# Patient Record
Sex: FEMALE | Race: OTHER | Employment: UNEMPLOYED | URBAN - METROPOLITAN AREA
[De-identification: names, ages, dates, MRNs, and addresses within clinical notes are randomized per-mention and may not be internally consistent; named-entity substitution may affect disease eponyms.]

---

## 2024-05-15 ENCOUNTER — OFFICE VISIT (OUTPATIENT)
Dept: PRIMARY CARE | Facility: CLINIC | Age: 29
End: 2024-05-15
Payer: MEDICAID

## 2024-05-15 VITALS
BODY MASS INDEX: 37.39 KG/M2 | OXYGEN SATURATION: 98 % | DIASTOLIC BLOOD PRESSURE: 92 MMHG | SYSTOLIC BLOOD PRESSURE: 136 MMHG | WEIGHT: 211 LBS | HEART RATE: 85 BPM | HEIGHT: 63 IN

## 2024-05-15 DIAGNOSIS — M25.551 BILATERAL HIP PAIN: ICD-10-CM

## 2024-05-15 DIAGNOSIS — E28.2 PCOS (POLYCYSTIC OVARIAN SYNDROME): ICD-10-CM

## 2024-05-15 DIAGNOSIS — Z01.419 ENCOUNTER FOR GYNECOLOGICAL EXAMINATION: ICD-10-CM

## 2024-05-15 DIAGNOSIS — B96.89 BV (BACTERIAL VAGINOSIS): ICD-10-CM

## 2024-05-15 DIAGNOSIS — N76.0 BV (BACTERIAL VAGINOSIS): ICD-10-CM

## 2024-05-15 DIAGNOSIS — R00.2 HEART PALPITATIONS: ICD-10-CM

## 2024-05-15 DIAGNOSIS — R10.2 PELVIC PAIN: ICD-10-CM

## 2024-05-15 DIAGNOSIS — M25.552 BILATERAL HIP PAIN: ICD-10-CM

## 2024-05-15 DIAGNOSIS — F41.9 ANXIETY: ICD-10-CM

## 2024-05-15 DIAGNOSIS — Z00.00 ANNUAL PHYSICAL EXAM: Primary | ICD-10-CM

## 2024-05-15 DIAGNOSIS — L70.0 ACNE VULGARIS: ICD-10-CM

## 2024-05-15 DIAGNOSIS — N92.6 IRREGULAR MENSES: ICD-10-CM

## 2024-05-15 DIAGNOSIS — L68.0 FEMALE HIRSUTISM: ICD-10-CM

## 2024-05-15 DIAGNOSIS — R63.5 WEIGHT GAIN: ICD-10-CM

## 2024-05-15 DIAGNOSIS — E66.9 CLASS 2 OBESITY: ICD-10-CM

## 2024-05-15 LAB
APPEARANCE UR: CLEAR
BILIRUB UR QL STRIP: NEGATIVE
COLOR UR: YELLOW
GLUCOSE UR STRIP-MCNC: NEGATIVE MG/DL
HGB UR QL STRIP: NEGATIVE
KETONES UR STRIP-MCNC: NEGATIVE MG/DL
LEUKOCYTE ESTERASE UR QL STRIP: NEGATIVE
NITRITE UR QL STRIP: NEGATIVE
PH UR STRIP: 5.5 [PH]
PROT UR STRIP-MCNC: NEGATIVE MG/DL
SP GR UR STRIP.AUTO: >=1.03
UROBILINOGEN UR STRIP-ACNC: 0.2 E.U./DL

## 2024-05-15 PROCEDURE — 87205 SMEAR GRAM STAIN: CPT

## 2024-05-15 PROCEDURE — 99204 OFFICE O/P NEW MOD 45 MIN: CPT | Performed by: NURSE PRACTITIONER

## 2024-05-15 PROCEDURE — 81003 URINALYSIS AUTO W/O SCOPE: CPT | Performed by: NURSE PRACTITIONER

## 2024-05-15 PROCEDURE — 1036F TOBACCO NON-USER: CPT | Performed by: NURSE PRACTITIONER

## 2024-05-15 RX ORDER — BUSPIRONE HYDROCHLORIDE 5 MG/1
5 TABLET ORAL 2 TIMES DAILY
Qty: 60 TABLET | Refills: 1 | Status: SHIPPED | OUTPATIENT
Start: 2024-05-15 | End: 2024-06-11 | Stop reason: WASHOUT

## 2024-05-15 RX ORDER — BUPROPION HYDROCHLORIDE 150 MG/1
150 TABLET ORAL EVERY MORNING
Qty: 30 TABLET | Refills: 1 | Status: SHIPPED | OUTPATIENT
Start: 2024-05-15 | End: 2024-06-11 | Stop reason: WASHOUT

## 2024-05-15 ASSESSMENT — ENCOUNTER SYMPTOMS
NERVOUS/ANXIOUS: 0
CONFUSION: 0
MUSCULOSKELETAL NEGATIVE: 1
WEAKNESS: 0
GASTROINTESTINAL NEGATIVE: 1
RESPIRATORY NEGATIVE: 1
PSYCHIATRIC NEGATIVE: 1
POLYPHAGIA: 0
DEPRESSION: 1
HEMATOLOGIC/LYMPHATIC NEGATIVE: 1
LIGHT-HEADEDNESS: 0
CONSTITUTIONAL NEGATIVE: 1
EYES NEGATIVE: 1
FACIAL ASYMMETRY: 0
WOUND: 0
SLEEP DISTURBANCE: 0
DIZZINESS: 0
NEUROLOGICAL NEGATIVE: 1
PALPITATIONS: 1

## 2024-05-16 ENCOUNTER — LAB (OUTPATIENT)
Dept: LAB | Facility: LAB | Age: 29
End: 2024-05-16
Payer: MEDICAID

## 2024-05-16 ENCOUNTER — HOSPITAL ENCOUNTER (OUTPATIENT)
Dept: RADIOLOGY | Facility: HOSPITAL | Age: 29
Discharge: HOME | End: 2024-05-16
Payer: MEDICAID

## 2024-05-16 DIAGNOSIS — E66.9 CLASS 2 OBESITY: ICD-10-CM

## 2024-05-16 DIAGNOSIS — M25.551 BILATERAL HIP PAIN: ICD-10-CM

## 2024-05-16 DIAGNOSIS — R10.2 PELVIC PAIN: ICD-10-CM

## 2024-05-16 DIAGNOSIS — M25.552 BILATERAL HIP PAIN: ICD-10-CM

## 2024-05-16 DIAGNOSIS — R63.5 WEIGHT GAIN: ICD-10-CM

## 2024-05-16 DIAGNOSIS — L70.0 ACNE VULGARIS: ICD-10-CM

## 2024-05-16 DIAGNOSIS — Z00.00 ANNUAL PHYSICAL EXAM: ICD-10-CM

## 2024-05-16 DIAGNOSIS — L68.0 FEMALE HIRSUTISM: ICD-10-CM

## 2024-05-16 DIAGNOSIS — N92.6 IRREGULAR MENSES: ICD-10-CM

## 2024-05-16 LAB
25(OH)D3 SERPL-MCNC: 25 NG/ML (ref 30–100)
ALBUMIN SERPL BCP-MCNC: 4.5 G/DL (ref 3.4–5)
ALP SERPL-CCNC: 111 U/L (ref 33–110)
ALT SERPL W P-5'-P-CCNC: 16 U/L (ref 7–45)
ANION GAP SERPL CALC-SCNC: 13 MMOL/L (ref 10–20)
AST SERPL W P-5'-P-CCNC: 16 U/L (ref 9–39)
BACTERIAL VAGINOSIS VAG-IMP: NORMAL
BASOPHILS # BLD AUTO: 0.06 X10*3/UL (ref 0–0.1)
BASOPHILS NFR BLD AUTO: 0.5 %
BILIRUB SERPL-MCNC: 0.5 MG/DL (ref 0–1.2)
BUN SERPL-MCNC: 8 MG/DL (ref 6–23)
CALCIUM SERPL-MCNC: 9.2 MG/DL (ref 8.6–10.3)
CHLORIDE SERPL-SCNC: 107 MMOL/L (ref 98–107)
CHOLEST SERPL-MCNC: 173 MG/DL (ref 0–199)
CHOLESTEROL/HDL RATIO: 3.7
CLUE CELLS VAG LPF-#/AREA: NORMAL /[LPF]
CO2 SERPL-SCNC: 23 MMOL/L (ref 21–32)
CREAT SERPL-MCNC: 0.54 MG/DL (ref 0.5–1.05)
EGFRCR SERPLBLD CKD-EPI 2021: >90 ML/MIN/1.73M*2
EOSINOPHIL # BLD AUTO: 0.07 X10*3/UL (ref 0–0.7)
EOSINOPHIL NFR BLD AUTO: 0.6 %
ERYTHROCYTE [DISTWIDTH] IN BLOOD BY AUTOMATED COUNT: 13 % (ref 11.5–14.5)
FSH SERPL-ACNC: 3.5 IU/L
GLUCOSE SERPL-MCNC: 85 MG/DL (ref 74–99)
HCT VFR BLD AUTO: 41 % (ref 36–46)
HDLC SERPL-MCNC: 47.3 MG/DL
HGB BLD-MCNC: 13.6 G/DL (ref 12–16)
IMM GRANULOCYTES # BLD AUTO: 0.02 X10*3/UL (ref 0–0.7)
IMM GRANULOCYTES NFR BLD AUTO: 0.2 % (ref 0–0.9)
IRON SATN MFR SERPL: 13 % (ref 25–45)
IRON SERPL-MCNC: 61 UG/DL (ref 35–150)
LDLC SERPL CALC-MCNC: 103 MG/DL
LH SERPL-ACNC: 5.9 IU/L
LYMPHOCYTES # BLD AUTO: 3.31 X10*3/UL (ref 1.2–4.8)
LYMPHOCYTES NFR BLD AUTO: 29.6 %
MCH RBC QN AUTO: 26.8 PG (ref 26–34)
MCHC RBC AUTO-ENTMCNC: 33.2 G/DL (ref 32–36)
MCV RBC AUTO: 81 FL (ref 80–100)
MONOCYTES # BLD AUTO: 0.52 X10*3/UL (ref 0.1–1)
MONOCYTES NFR BLD AUTO: 4.6 %
NEUTROPHILS # BLD AUTO: 7.22 X10*3/UL (ref 1.2–7.7)
NEUTROPHILS NFR BLD AUTO: 64.5 %
NON HDL CHOLESTEROL: 126 MG/DL (ref 0–149)
NRBC BLD-RTO: 0 /100 WBCS (ref 0–0)
NUGENT SCORE: 4
PLATELET # BLD AUTO: 337 X10*3/UL (ref 150–450)
POTASSIUM SERPL-SCNC: 4.1 MMOL/L (ref 3.5–5.3)
PROT SERPL-MCNC: 7.3 G/DL (ref 6.4–8.2)
RBC # BLD AUTO: 5.08 X10*6/UL (ref 4–5.2)
SODIUM SERPL-SCNC: 139 MMOL/L (ref 136–145)
T3 SERPL-MCNC: 144 NG/DL (ref 60–200)
T4 SERPL-MCNC: 7.2 UG/DL (ref 4.5–11.1)
TIBC SERPL-MCNC: 455 UG/DL (ref 240–445)
TRIGL SERPL-MCNC: 116 MG/DL (ref 0–149)
TSH SERPL-ACNC: 1.82 MIU/L (ref 0.44–3.98)
UIBC SERPL-MCNC: 394 UG/DL (ref 110–370)
VLDL: 23 MG/DL (ref 0–40)
WBC # BLD AUTO: 11.2 X10*3/UL (ref 4.4–11.3)
YEAST VAG WET PREP-#/AREA: NORMAL

## 2024-05-16 PROCEDURE — 83550 IRON BINDING TEST: CPT

## 2024-05-16 PROCEDURE — 82306 VITAMIN D 25 HYDROXY: CPT

## 2024-05-16 PROCEDURE — 73522 X-RAY EXAM HIPS BI 3-4 VIEWS: CPT | Mod: BILATERAL PROCEDURE | Performed by: RADIOLOGY

## 2024-05-16 PROCEDURE — 83002 ASSAY OF GONADOTROPIN (LH): CPT

## 2024-05-16 PROCEDURE — 84402 ASSAY OF FREE TESTOSTERONE: CPT

## 2024-05-16 PROCEDURE — 84436 ASSAY OF TOTAL THYROXINE: CPT

## 2024-05-16 PROCEDURE — 85025 COMPLETE CBC W/AUTO DIFF WBC: CPT

## 2024-05-16 PROCEDURE — 36415 COLL VENOUS BLD VENIPUNCTURE: CPT

## 2024-05-16 PROCEDURE — 73522 X-RAY EXAM HIPS BI 3-4 VIEWS: CPT

## 2024-05-16 PROCEDURE — 80061 LIPID PANEL: CPT

## 2024-05-16 PROCEDURE — 84480 ASSAY TRIIODOTHYRONINE (T3): CPT

## 2024-05-16 PROCEDURE — 83540 ASSAY OF IRON: CPT

## 2024-05-16 PROCEDURE — 83036 HEMOGLOBIN GLYCOSYLATED A1C: CPT

## 2024-05-16 PROCEDURE — 84443 ASSAY THYROID STIM HORMONE: CPT

## 2024-05-16 PROCEDURE — 80053 COMPREHEN METABOLIC PANEL: CPT

## 2024-05-16 PROCEDURE — 83001 ASSAY OF GONADOTROPIN (FSH): CPT

## 2024-05-16 PROCEDURE — 82672 ASSAY OF ESTROGEN: CPT

## 2024-05-17 ENCOUNTER — HOSPITAL ENCOUNTER (OUTPATIENT)
Dept: CARDIOLOGY | Facility: CLINIC | Age: 29
Discharge: HOME | End: 2024-05-17
Payer: MEDICAID

## 2024-05-17 DIAGNOSIS — D50.9 IRON DEFICIENCY ANEMIA, UNSPECIFIED IRON DEFICIENCY ANEMIA TYPE: ICD-10-CM

## 2024-05-17 DIAGNOSIS — E55.9 VITAMIN D DEFICIENCY: Primary | ICD-10-CM

## 2024-05-17 DIAGNOSIS — R00.2 HEART PALPITATIONS: ICD-10-CM

## 2024-05-17 LAB
EST. AVERAGE GLUCOSE BLD GHB EST-MCNC: 103 MG/DL
HBA1C MFR BLD: 5.2 %

## 2024-05-17 PROCEDURE — 93225 XTRNL ECG REC<48 HRS REC: CPT

## 2024-05-17 PROCEDURE — 93227 XTRNL ECG REC<48 HR R&I: CPT | Performed by: INTERNAL MEDICINE

## 2024-05-17 RX ORDER — FERROUS GLUCONATE 325 MG
38 TABLET ORAL
Qty: 30 TABLET | Refills: 11 | Status: SHIPPED | OUTPATIENT
Start: 2024-05-17 | End: 2025-05-17

## 2024-05-17 RX ORDER — ERGOCALCIFEROL 1.25 MG/1
50000 CAPSULE ORAL
Qty: 4 CAPSULE | Refills: 1 | Status: SHIPPED | OUTPATIENT
Start: 2024-05-19 | End: 2024-07-14

## 2024-05-20 LAB
TESTOSTERONE FREE (CHAN): 8.8 PG/ML (ref 0.1–6.4)
TESTOSTERONE,TOTAL,LC-MS/MS: 60 NG/DL (ref 2–45)

## 2024-05-21 ENCOUNTER — HOSPITAL ENCOUNTER (OUTPATIENT)
Dept: RADIOLOGY | Facility: CLINIC | Age: 29
Discharge: HOME | End: 2024-05-21
Payer: MEDICAID

## 2024-05-21 DIAGNOSIS — E28.2 PCOS (POLYCYSTIC OVARIAN SYNDROME): ICD-10-CM

## 2024-05-21 DIAGNOSIS — N92.6 IRREGULAR MENSES: ICD-10-CM

## 2024-05-21 DIAGNOSIS — R63.5 WEIGHT GAIN: ICD-10-CM

## 2024-05-21 PROCEDURE — 76856 US EXAM PELVIC COMPLETE: CPT | Performed by: RADIOLOGY

## 2024-05-21 PROCEDURE — 76856 US EXAM PELVIC COMPLETE: CPT

## 2024-05-21 PROCEDURE — 76830 TRANSVAGINAL US NON-OB: CPT | Performed by: RADIOLOGY

## 2024-05-24 LAB — ESTROGEN SERPL-MCNC: 365 PG/ML

## 2024-05-28 DIAGNOSIS — E28.2 PCOS (POLYCYSTIC OVARIAN SYNDROME): Primary | ICD-10-CM

## 2024-05-28 RX ORDER — SPIRONOLACTONE 25 MG/1
12.5 TABLET ORAL DAILY
Qty: 15 TABLET | Refills: 3 | Status: SHIPPED | OUTPATIENT
Start: 2024-05-28 | End: 2024-09-25

## 2024-05-28 RX ORDER — METFORMIN HYDROCHLORIDE 500 MG/1
500 TABLET, EXTENDED RELEASE ORAL
Qty: 30 TABLET | Refills: 11 | Status: SHIPPED | OUTPATIENT
Start: 2024-05-28 | End: 2025-05-28

## 2024-06-05 ASSESSMENT — PROMIS GLOBAL HEALTH SCALE
RATE_MENTAL_HEALTH: FAIR
RATE_QUALITY_OF_LIFE: GOOD
RATE_AVERAGE_FATIGUE: SEVERE
RATE_PHYSICAL_HEALTH: FAIR
EMOTIONAL_PROBLEMS: OFTEN
CARRYOUT_SOCIAL_ACTIVITIES: GOOD
RATE_GENERAL_HEALTH: GOOD
RATE_AVERAGE_PAIN: 0
CARRYOUT_PHYSICAL_ACTIVITIES: COMPLETELY
RATE_SOCIAL_SATISFACTION: GOOD

## 2024-06-11 ENCOUNTER — LAB (OUTPATIENT)
Dept: LAB | Facility: LAB | Age: 29
End: 2024-06-11
Payer: MEDICAID

## 2024-06-11 ENCOUNTER — OFFICE VISIT (OUTPATIENT)
Dept: PRIMARY CARE | Facility: CLINIC | Age: 29
End: 2024-06-11
Payer: MEDICAID

## 2024-06-11 VITALS
SYSTOLIC BLOOD PRESSURE: 128 MMHG | HEART RATE: 100 BPM | HEIGHT: 63 IN | BODY MASS INDEX: 36.86 KG/M2 | WEIGHT: 208 LBS | DIASTOLIC BLOOD PRESSURE: 82 MMHG | OXYGEN SATURATION: 98 %

## 2024-06-11 DIAGNOSIS — F41.9 ANXIETY: Primary | ICD-10-CM

## 2024-06-11 DIAGNOSIS — Z83.2 FAMILY HISTORY OF AUTOIMMUNE DISORDER: ICD-10-CM

## 2024-06-11 DIAGNOSIS — R20.2 TINGLING IN EXTREMITIES: ICD-10-CM

## 2024-06-11 LAB
CCP IGG SERPL-ACNC: <1 U/ML
CRP SERPL-MCNC: 0.63 MG/DL
ERYTHROCYTE [SEDIMENTATION RATE] IN BLOOD BY WESTERGREN METHOD: 8 MM/H (ref 0–20)
RHEUMATOID FACT SER NEPH-ACNC: 10 IU/ML (ref 0–15)

## 2024-06-11 PROCEDURE — 86140 C-REACTIVE PROTEIN: CPT

## 2024-06-11 PROCEDURE — 86200 CCP ANTIBODY: CPT

## 2024-06-11 PROCEDURE — 86431 RHEUMATOID FACTOR QUANT: CPT

## 2024-06-11 PROCEDURE — 86038 ANTINUCLEAR ANTIBODIES: CPT

## 2024-06-11 PROCEDURE — 85652 RBC SED RATE AUTOMATED: CPT

## 2024-06-11 PROCEDURE — 99213 OFFICE O/P EST LOW 20 MIN: CPT | Performed by: NURSE PRACTITIONER

## 2024-06-11 PROCEDURE — 36415 COLL VENOUS BLD VENIPUNCTURE: CPT

## 2024-06-11 PROCEDURE — 1036F TOBACCO NON-USER: CPT | Performed by: NURSE PRACTITIONER

## 2024-06-11 RX ORDER — ESCITALOPRAM OXALATE 5 MG/1
5 TABLET ORAL DAILY
Qty: 30 TABLET | Refills: 2 | Status: SHIPPED | OUTPATIENT
Start: 2024-06-11 | End: 2024-09-09

## 2024-06-11 ASSESSMENT — ENCOUNTER SYMPTOMS
RESPIRATORY NEGATIVE: 1
FACIAL ASYMMETRY: 0
WOUND: 0
PSYCHIATRIC NEGATIVE: 1
WEAKNESS: 0
DEPRESSION: 0
CONSTITUTIONAL NEGATIVE: 1
GASTROINTESTINAL NEGATIVE: 1
CONFUSION: 0
POLYPHAGIA: 0
LIGHT-HEADEDNESS: 0
NERVOUS/ANXIOUS: 0
MUSCULOSKELETAL NEGATIVE: 1
EYES NEGATIVE: 1
CARDIOVASCULAR NEGATIVE: 1
HEMATOLOGIC/LYMPHATIC NEGATIVE: 1
SLEEP DISTURBANCE: 0
NEUROLOGICAL NEGATIVE: 1
DIZZINESS: 0

## 2024-06-11 NOTE — PROGRESS NOTES
"Subjective   Patient ID: Do Cha is a 28 y.o. female who presents for Follow-up (1 month follow up).    HPI   Guzman is here for FU   Discussed labs and test results   Feels dizzy on the BuSpar.  Would like to go to a single dose medication for anxiety.  Agreeable to try Lexapro.    Of note she would like some autoimmune testing done.  Her mother does have significant history of rheumatoid arthritis.    Does complain of bilateral tingling in fingers.      Review of Systems   Constitutional: Negative.    Eyes: Negative.    Respiratory: Negative.     Cardiovascular: Negative.    Gastrointestinal: Negative.    Endocrine: Negative for polyphagia.   Genitourinary: Negative.    Musculoskeletal: Negative.    Skin: Negative.  Negative for pallor, rash and wound.   Neurological: Negative.  Negative for dizziness, facial asymmetry, weakness and light-headedness.   Hematological: Negative.    Psychiatric/Behavioral: Negative.  Negative for confusion, sleep disturbance and suicidal ideas. The patient is not nervous/anxious.    All other systems reviewed and are negative.      Objective   /82 (BP Location: Left arm, Patient Position: Sitting, BP Cuff Size: Adult)   Pulse 100   Ht 1.6 m (5' 3\")   Wt 94.3 kg (208 lb)   SpO2 98%   BMI 36.85 kg/m²     Physical Exam  Vitals and nursing note reviewed.   Constitutional:       Appearance: Normal appearance. She is normal weight.   HENT:      Head: Normocephalic.      Nose: Nose normal.      Mouth/Throat:      Mouth: Mucous membranes are moist.   Eyes:      Extraocular Movements: Extraocular movements intact.      Conjunctiva/sclera: Conjunctivae normal.      Pupils: Pupils are equal, round, and reactive to light.   Cardiovascular:      Rate and Rhythm: Normal rate and regular rhythm.      Pulses: Normal pulses.      Heart sounds: Normal heart sounds.   Pulmonary:      Effort: Pulmonary effort is normal.   Abdominal:      General: Abdomen is flat. Bowel sounds are " normal.      Palpations: Abdomen is soft.   Musculoskeletal:         General: Normal range of motion.      Cervical back: Normal range of motion.   Skin:     General: Skin is warm and dry.   Neurological:      General: No focal deficit present.      Mental Status: She is alert and oriented to person, place, and time.   Psychiatric:         Mood and Affect: Mood normal.         Behavior: Behavior normal.         Assessment/Plan   1. Anxiety    - escitalopram (Lexapro) 5 mg tablet; Take 1 tablet (5 mg) by mouth once daily.  Dispense: 30 tablet; Refill: 2    2. Family history of autoimmune disorder  - C-Reactive Protein; Future  - Sedimentation Rate; Future  - CADEN without Reflex TONY; Future  - Rheumatoid Factor; Future  - Citrulline Antibody, IgG; Future    3. Tingling in extremities  - EMG & nerve conduction; Future        FU in 1-2 months for labs and medication tolerance

## 2024-06-13 LAB — ANA SER QL HEP2 SUBST: NEGATIVE

## 2024-06-17 ENCOUNTER — APPOINTMENT (OUTPATIENT)
Dept: PRIMARY CARE | Facility: CLINIC | Age: 29
End: 2024-06-17
Payer: MEDICAID

## 2024-07-08 ASSESSMENT — ENCOUNTER SYMPTOMS
ABDOMINAL PAIN: 0
FREQUENCY: 0
SORE THROAT: 0
CHILLS: 0
HEADACHES: 0
ANOREXIA: 0
HEMATURIA: 0
FLANK PAIN: 0
CONSTIPATION: 0
DIARRHEA: 0
BACK PAIN: 1
DYSURIA: 0
VOMITING: 0
FEVER: 0
NAUSEA: 0

## 2024-07-09 ENCOUNTER — APPOINTMENT (OUTPATIENT)
Dept: OBSTETRICS AND GYNECOLOGY | Facility: CLINIC | Age: 29
End: 2024-07-09
Payer: MEDICAID

## 2024-07-09 VITALS — WEIGHT: 205 LBS | BODY MASS INDEX: 36.32 KG/M2 | HEIGHT: 63 IN

## 2024-07-09 DIAGNOSIS — Z01.419 ENCOUNTER FOR GYNECOLOGICAL EXAMINATION: ICD-10-CM

## 2024-07-09 DIAGNOSIS — N83.202 LEFT OVARIAN CYST: Primary | ICD-10-CM

## 2024-07-09 PROCEDURE — 99385 PREV VISIT NEW AGE 18-39: CPT | Performed by: OBSTETRICS & GYNECOLOGY

## 2024-07-09 PROCEDURE — 1036F TOBACCO NON-USER: CPT | Performed by: OBSTETRICS & GYNECOLOGY

## 2024-07-09 PROCEDURE — 88175 CYTOPATH C/V AUTO FLUID REDO: CPT

## 2024-07-09 ASSESSMENT — PAIN SCALES - GENERAL: PAINLEVEL: 0-NO PAIN

## 2024-07-09 NOTE — PROGRESS NOTES
"Do Cha is a 29 y.o. female who is here for a routine exam. PCP = Annel Saavedra, APRN-CNP  Recently diagnosed with PCOS based on ultrasound, irregular menses, weight issues, and acne  Elevated total and free testosterone  Pelvic ultrasound mid May showed 2 left ovarian cysts as well as multiple follicles on each ovary    C/o decreased libido since her son was born - rarely has intercourse with     Unable to lose weight  Weighed around 160# prior to first pregnancy, 154# PP visit after second pregnancy  PP depression after second pregnancy, ate more with depression    Menses : Q. 5 to 6 weeks  Contraception : none  HPV vaccine : No  Last pap : 2021? normal  Last HPV : uncertain but negative  History of abnormal pap : Yes, describe: abnormal pap and HPV + but follow up normal  Last mammogram : never  History of abnormal mammogram : No  Colon cancer screen : never    ROS  systems reviewed, anything negative noted in HPI    bladder: no dysuria, gross hematuria, urinary frequency, urgency or incontinence  breast: no lumps, nipple d/c, overlying skin changes, redness, or skin retraction    [unfilled]    Past med hx and past surg hx reviewed and notable for: PCOS    Objective   Ht 1.6 m (5' 3\")   Wt 93 kg (205 lb)   LMP 07/02/2024   BMI 36.31 kg/m²      General:   Alert and oriented, in no acute distress   Neck: Supple. No visible thyromegaly.    Breast/Axilla: Normal to palpation bilaterally without masses, skin changes, lymphadenopathy, or nipple discharge.    Abdomen: Soft, non-tender, without masses or organomegaly   Vulva: Normal architecture without erythema, masses, or lesions.    Vagina: Normal mucosa without lesions, masses, or atrophy. No abnormal vaginal discharge.    Cervix: Normal without masses, lesions, or signs of cervicitis.    Uterus: Normal mobile, non-enlarged uterus    Adnexa: Normal without masses or lesions   Pelvic Floor No POP noted.    Psych Normal affect. Normal mood.      Thank " you for coming to your annual exam. Your findings during the exam were normal. Specific topics addressed during this exam included: healthy lifestyle, well woman screening guidelines,     Actions performed during this visit include:  - Clinical breast exam  - Clinical pelvic exam  - pap/hpv  - pt declines hormonal options for contraception and regulating cycle  - will continue spironolactone and metformin with hopes that additional weight loss will regulate cycle  Orders Placed This Encounter   Procedures    US PELVIS TRANSABDOMINAL WITH TRANSVAGINAL           Please return for your next visit in 1 year.  Answers submitted by the patient for this visit:  Female Genital Questionnaire (Submitted on 7/8/2024)  Chief Complaint: Female genitourinary complaint  genital itching: Yes  genital lesions: No  genital odor: Yes  genital rash: No  missed menses: No  pelvic pain: No  vaginal bleeding: No  vaginal discharge: Yes  Chronicity: new  Onset: more than 1 month ago  Frequency: daily  Progression since onset: waxing and waning  Severity of pain: no pain  Pregnant now?: No  abdominal pain: No  anorexia: No  back pain: Yes  chills: No  constipation: No  diarrhea: No  discolored urine: No  dysuria: No  fever: No  flank pain: No  frequency: No  headaches: No  hematuria: No  joint pain: No  joint swelling: No  nausea: No  painful intercourse: No  rash: No  sore throat: No  urgency: No  vomiting: No  Aggravated by: nothing, activity  Sexual activity: sexually active  Partner with STD symptoms: no  Birth control: condoms  Menstrual history: regular  Discharge characteristics: copious, watery, white, yellow  Passing clots?: No  Passing tissue?: No

## 2024-07-17 LAB
CYTOLOGY CMNT CVX/VAG CYTO-IMP: NORMAL
LAB AP HPV GENOTYPE QUESTION: YES
LAB AP HPV HR: NORMAL
LABORATORY COMMENT REPORT: NORMAL
LMP START DATE: NORMAL
PATH REPORT.TOTAL CANCER: NORMAL

## 2024-07-18 ENCOUNTER — APPOINTMENT (OUTPATIENT)
Dept: PRIMARY CARE | Facility: CLINIC | Age: 29
End: 2024-07-18
Payer: MEDICAID

## 2024-07-18 DIAGNOSIS — R06.83 SNORING: ICD-10-CM

## 2024-07-18 DIAGNOSIS — E28.2 PCOS (POLYCYSTIC OVARIAN SYNDROME): ICD-10-CM

## 2024-07-18 DIAGNOSIS — D50.9 IRON DEFICIENCY ANEMIA, UNSPECIFIED IRON DEFICIENCY ANEMIA TYPE: ICD-10-CM

## 2024-07-18 DIAGNOSIS — G47.19 EXCESSIVE DAYTIME SLEEPINESS: ICD-10-CM

## 2024-07-18 DIAGNOSIS — M54.50 ACUTE BILATERAL LOW BACK PAIN WITHOUT SCIATICA: ICD-10-CM

## 2024-07-18 DIAGNOSIS — S03.00XA DISLOCATION OF TEMPOROMANDIBULAR JOINT, INITIAL ENCOUNTER: ICD-10-CM

## 2024-07-18 DIAGNOSIS — F41.9 ANXIETY: Primary | ICD-10-CM

## 2024-07-18 DIAGNOSIS — R68.84 JAW PAIN: ICD-10-CM

## 2024-07-18 DIAGNOSIS — N39.46 MIXED STRESS AND URGE URINARY INCONTINENCE: ICD-10-CM

## 2024-07-18 LAB
FERRITIN SERPL-MCNC: 34 NG/ML (ref 8–150)
FOLATE SERPL-MCNC: 9.6 NG/ML
IRON SATN MFR SERPL: 25 % (ref 25–45)
IRON SERPL-MCNC: 106 UG/DL (ref 35–150)
TIBC SERPL-MCNC: 428 UG/DL (ref 240–445)
UIBC SERPL-MCNC: 322 UG/DL (ref 110–370)

## 2024-07-18 PROCEDURE — 99213 OFFICE O/P EST LOW 20 MIN: CPT | Performed by: NURSE PRACTITIONER

## 2024-07-18 PROCEDURE — 36415 COLL VENOUS BLD VENIPUNCTURE: CPT

## 2024-07-18 PROCEDURE — 83540 ASSAY OF IRON: CPT

## 2024-07-18 PROCEDURE — 82728 ASSAY OF FERRITIN: CPT

## 2024-07-18 PROCEDURE — 82746 ASSAY OF FOLIC ACID SERUM: CPT

## 2024-07-18 PROCEDURE — 83550 IRON BINDING TEST: CPT

## 2024-07-18 RX ORDER — CYCLOBENZAPRINE HCL 5 MG
5 TABLET ORAL 3 TIMES DAILY PRN
Qty: 30 TABLET | Refills: 0 | Status: SHIPPED | OUTPATIENT
Start: 2024-07-18 | End: 2024-09-16

## 2024-07-18 RX ORDER — METFORMIN HYDROCHLORIDE 500 MG/1
500 TABLET, EXTENDED RELEASE ORAL
Qty: 60 TABLET | Refills: 11 | Status: SHIPPED | OUTPATIENT
Start: 2024-07-18 | End: 2025-07-18

## 2024-07-18 RX ORDER — ESCITALOPRAM OXALATE 10 MG/1
10 TABLET ORAL DAILY
Qty: 30 TABLET | Refills: 2 | Status: SHIPPED | OUTPATIENT
Start: 2024-07-18 | End: 2024-10-16

## 2024-07-18 ASSESSMENT — ENCOUNTER SYMPTOMS
WEAKNESS: 0
SLEEP DISTURBANCE: 1
LIGHT-HEADEDNESS: 0
FACIAL ASYMMETRY: 0
POLYPHAGIA: 0
JOINT SWELLING: 1
BACK PAIN: 1
RESPIRATORY NEGATIVE: 1
NERVOUS/ANXIOUS: 0
HEMATOLOGIC/LYMPHATIC NEGATIVE: 1
GASTROINTESTINAL NEGATIVE: 1
WOUND: 0
ALLERGIC/IMMUNOLOGIC NEGATIVE: 1
CONSTITUTIONAL NEGATIVE: 1
CARDIOVASCULAR NEGATIVE: 1
CONFUSION: 0
ARTHRALGIAS: 1
NEUROLOGICAL NEGATIVE: 1
EYES NEGATIVE: 1
MYALGIAS: 1
ENDOCRINE NEGATIVE: 1
DIZZINESS: 0

## 2024-07-18 NOTE — PROGRESS NOTES
"Subjective   Patient ID: Do Cha is a 29 y.o. female who presents for No chief complaint on file..    HPI   Doing ok on lexparo   Needs iron testing  EMG next week   - c/o jaw pain while eating and laughing, continues to get worse   States that she is excessively tired during the day and  report she's \"Snores all night long\"   BMI 36: needs sleep study r/o sleep disorder     Of note c/o worsening lower back pain and urinary inc. With laughing.   Discussed pelvic floor therapy  No loss of bowel function or bladder function       Review of Systems   Constitutional: Negative.    HENT:  Positive for dental problem.    Eyes: Negative.    Respiratory: Negative.     Cardiovascular: Negative.    Gastrointestinal: Negative.    Endocrine: Negative.  Negative for polyphagia.   Genitourinary:  Positive for urgency. Negative for decreased urine volume.   Musculoskeletal:  Positive for arthralgias, back pain, joint swelling and myalgias.   Skin: Negative.  Negative for pallor, rash and wound.   Allergic/Immunologic: Negative.    Neurological: Negative.  Negative for dizziness, facial asymmetry, weakness and light-headedness.   Hematological: Negative.    Psychiatric/Behavioral:  Positive for sleep disturbance. Negative for confusion and suicidal ideas. The patient is not nervous/anxious.    All other systems reviewed and are negative.      Objective   LMP 07/02/2024     Physical Exam  Vitals and nursing note reviewed.   Constitutional:       Appearance: Normal appearance. She is normal weight.   HENT:      Head: Normocephalic.      Nose: Nose normal.      Mouth/Throat:      Mouth: Mucous membranes are moist.   Eyes:      Extraocular Movements: Extraocular movements intact.      Conjunctiva/sclera: Conjunctivae normal.      Pupils: Pupils are equal, round, and reactive to light.   Cardiovascular:      Rate and Rhythm: Normal rate and regular rhythm.      Pulses: Normal pulses.      Heart sounds: Normal heart sounds. "   Pulmonary:      Effort: Pulmonary effort is normal.   Abdominal:      General: Abdomen is flat. Bowel sounds are normal.      Palpations: Abdomen is soft.   Musculoskeletal:         General: Tenderness and signs of injury present. No swelling or deformity. Normal range of motion.      Cervical back: Normal range of motion.      Right lower leg: No edema.      Left lower leg: No edema.   Skin:     General: Skin is warm and dry.   Neurological:      General: No focal deficit present.      Mental Status: She is alert and oriented to person, place, and time.   Psychiatric:         Mood and Affect: Mood normal.         Behavior: Behavior normal.         Assessment/Plan   1. Anxiety  - escitalopram (Lexapro) 10 mg tablet; Take 1 tablet (10 mg) by mouth once daily.  Dispense: 30 tablet; Refill: 2    2. Iron deficiency anemia, unspecified iron deficiency anemia type  - Iron and TIBC  - Ferritin  - Folate    3. Snoring  - Home sleep apnea test (HSAT); Future    4. Jaw pain  - XR temporomandibular joint open and closed bilateral; Future  - Referral to Oral Maxillofacial Surgery; Future  - cyclobenzaprine (Flexeril) 5 mg tablet; Take 1 tablet (5 mg) by mouth 3 times a day as needed for muscle spasms.  Dispense: 30 tablet; Refill: 0    5. Excessive daytime sleepiness  - Home sleep apnea test (HSAT); Future    6. Acute bilateral low back pain without sciatica    - XR lumbar spine 2-3 views; Future  - Referral to Physical Therapy; Future  - cyclobenzaprine (Flexeril) 5 mg tablet; Take 1 tablet (5 mg) by mouth 3 times a day as needed for muscle spasms.  Dispense: 30 tablet; Refill: 0    7. Mixed stress and urge urinary incontinence  - Referral to Physical Therapy; Future    8. Dislocation of temporomandibular joint, initial encounter  - Referral to Oral Maxillofacial Surgery; Future    9. PCOS (polycystic ovarian syndrome)    - metFORMIN XR (metFORMIN, MOD,) 500 mg 24 hr tablet; Take 1 tablet (500 mg) by mouth 2 times daily  (morning and late afternoon). Do not crush, chew, or split.  Dispense: 60 tablet; Refill: 11         FU in 3 months

## 2024-07-23 ENCOUNTER — HOSPITAL ENCOUNTER (OUTPATIENT)
Dept: RADIOLOGY | Facility: HOSPITAL | Age: 29
Discharge: HOME | End: 2024-07-23
Payer: MEDICAID

## 2024-07-23 ENCOUNTER — HOSPITAL ENCOUNTER (OUTPATIENT)
Dept: NEUROLOGY | Facility: HOSPITAL | Age: 29
Discharge: HOME | End: 2024-07-23
Payer: MEDICAID

## 2024-07-23 DIAGNOSIS — M54.50 ACUTE BILATERAL LOW BACK PAIN WITHOUT SCIATICA: ICD-10-CM

## 2024-07-23 DIAGNOSIS — N83.202 LEFT OVARIAN CYST: ICD-10-CM

## 2024-07-23 DIAGNOSIS — R20.2 TINGLING IN EXTREMITIES: ICD-10-CM

## 2024-07-23 DIAGNOSIS — R68.84 JAW PAIN: ICD-10-CM

## 2024-07-23 PROCEDURE — 76856 US EXAM PELVIC COMPLETE: CPT

## 2024-07-23 PROCEDURE — 95886 MUSC TEST DONE W/N TEST COMP: CPT | Performed by: PSYCHIATRY & NEUROLOGY

## 2024-07-23 PROCEDURE — 76830 TRANSVAGINAL US NON-OB: CPT | Performed by: RADIOLOGY

## 2024-07-23 PROCEDURE — 70330 X-RAY EXAM OF JAW JOINTS: CPT

## 2024-07-23 PROCEDURE — 72100 X-RAY EXAM L-S SPINE 2/3 VWS: CPT

## 2024-07-23 PROCEDURE — 95911 NRV CNDJ TEST 9-10 STUDIES: CPT | Performed by: PSYCHIATRY & NEUROLOGY

## 2024-07-23 PROCEDURE — 76856 US EXAM PELVIC COMPLETE: CPT | Performed by: RADIOLOGY

## 2024-07-23 PROCEDURE — 70330 X-RAY EXAM OF JAW JOINTS: CPT | Mod: BILATERAL PROCEDURE | Performed by: RADIOLOGY

## 2024-07-25 DIAGNOSIS — N83.291 COMPLEX CYST OF RIGHT OVARY: Primary | ICD-10-CM

## 2024-08-09 ENCOUNTER — PROCEDURE VISIT (OUTPATIENT)
Dept: SLEEP MEDICINE | Facility: CLINIC | Age: 29
End: 2024-08-09
Payer: MEDICAID

## 2024-08-09 DIAGNOSIS — R06.83 SNORING: ICD-10-CM

## 2024-08-09 DIAGNOSIS — G47.19 EXCESSIVE DAYTIME SLEEPINESS: ICD-10-CM

## 2024-08-09 NOTE — PROGRESS NOTES
Type of Study: HOME SLEEP STUDY - NOMAD     The patient received equipment and instructions for use of the [employer] [facility] Nomad HSAT device. The patient was instructed how to apply the effort belts, cannula, thermistor. It was also explained how the Nomad and oximeter components work.  The patient was asked to record their sleep for an 8-hour period.     The patient was informed of their responsibility for the device and acknowledged this by signing the HSAT device contract. The patient was asked to return the device on 8/10/2024 between the hours of 7:00 AM- 3:00 PM to the [facility].     The patient was instructed to call 911 as usual for any medical- emergencies while at home.  The patient was also given a phone number for troubleshooting when using the device in case there were additional questions.

## 2024-09-16 DIAGNOSIS — E28.2 PCOS (POLYCYSTIC OVARIAN SYNDROME): ICD-10-CM

## 2024-09-16 RX ORDER — METFORMIN HYDROCHLORIDE 500 MG/1
1000 TABLET, EXTENDED RELEASE ORAL
Qty: 120 TABLET | Refills: 11 | Status: SHIPPED | OUTPATIENT
Start: 2024-09-16 | End: 2025-09-16

## 2024-09-18 ENCOUNTER — HOSPITAL ENCOUNTER (OUTPATIENT)
Dept: RADIOLOGY | Facility: HOSPITAL | Age: 29
Discharge: HOME | End: 2024-09-18
Payer: MEDICAID

## 2024-09-18 DIAGNOSIS — N83.291 COMPLEX CYST OF RIGHT OVARY: ICD-10-CM

## 2024-09-18 PROCEDURE — 76830 TRANSVAGINAL US NON-OB: CPT | Performed by: RADIOLOGY

## 2024-09-18 PROCEDURE — 76856 US EXAM PELVIC COMPLETE: CPT

## 2024-09-18 PROCEDURE — 76856 US EXAM PELVIC COMPLETE: CPT | Performed by: RADIOLOGY

## 2024-09-25 ENCOUNTER — APPOINTMENT (OUTPATIENT)
Dept: PRIMARY CARE | Facility: CLINIC | Age: 29
End: 2024-09-25
Payer: MEDICAID

## 2024-10-23 ENCOUNTER — OFFICE VISIT (OUTPATIENT)
Dept: PRIMARY CARE | Facility: CLINIC | Age: 29
End: 2024-10-23
Payer: MEDICAID

## 2024-10-23 ENCOUNTER — APPOINTMENT (OUTPATIENT)
Dept: PRIMARY CARE | Facility: CLINIC | Age: 29
End: 2024-10-23
Payer: MEDICAID

## 2024-10-23 VITALS
WEIGHT: 205 LBS | HEART RATE: 89 BPM | HEIGHT: 63 IN | DIASTOLIC BLOOD PRESSURE: 82 MMHG | SYSTOLIC BLOOD PRESSURE: 124 MMHG | BODY MASS INDEX: 36.32 KG/M2 | OXYGEN SATURATION: 98 %

## 2024-10-23 DIAGNOSIS — R22.1 LOCALIZED SWELLING, MASS OR LUMP OF NECK: ICD-10-CM

## 2024-10-23 DIAGNOSIS — F41.9 ANXIETY: ICD-10-CM

## 2024-10-23 DIAGNOSIS — R22.1 NECK MASS: Primary | ICD-10-CM

## 2024-10-23 DIAGNOSIS — M54.2 POSTERIOR NECK PAIN: ICD-10-CM

## 2024-10-23 PROCEDURE — 3008F BODY MASS INDEX DOCD: CPT | Performed by: NURSE PRACTITIONER

## 2024-10-23 PROCEDURE — 1036F TOBACCO NON-USER: CPT | Performed by: NURSE PRACTITIONER

## 2024-10-23 PROCEDURE — 99214 OFFICE O/P EST MOD 30 MIN: CPT | Performed by: NURSE PRACTITIONER

## 2024-10-23 RX ORDER — SERTRALINE HYDROCHLORIDE 25 MG/1
25 TABLET, FILM COATED ORAL DAILY
Qty: 30 TABLET | Refills: 5 | Status: SHIPPED | OUTPATIENT
Start: 2024-10-23 | End: 2025-04-21

## 2024-10-23 ASSESSMENT — ENCOUNTER SYMPTOMS
CARDIOVASCULAR NEGATIVE: 1
LIGHT-HEADEDNESS: 0
NECK STIFFNESS: 1
HEMATOLOGIC/LYMPHATIC NEGATIVE: 1
ALLERGIC/IMMUNOLOGIC NEGATIVE: 1
NERVOUS/ANXIOUS: 1
WEAKNESS: 0
SLEEP DISTURBANCE: 1
EYES NEGATIVE: 1
WOUND: 0
CONSTITUTIONAL NEGATIVE: 1
DEPRESSION: 1
CONFUSION: 0
RESPIRATORY NEGATIVE: 1
ENDOCRINE NEGATIVE: 1
DIZZINESS: 0
NECK PAIN: 1
GASTROINTESTINAL NEGATIVE: 1
FACIAL ASYMMETRY: 0
POLYPHAGIA: 0
MYALGIAS: 1
HEADACHES: 0

## 2024-10-23 NOTE — PROGRESS NOTES
"Subjective   Patient ID: Do Cha is a 29 y.o. female who presents for Follow-up.    [facility]   Clinic for follow-up.  States she still feels anxious and upset.  Moods are worsening.  Wants to consider switching meds.  Considering getting CPAP as sleep study revealed sleep apnea.  Does still complain of soreness and tenderness to posterior neck wall as she does still have mass noted.  Very tender upon palpation.  Since last visit.  She will complete CT neck to assess for mass versus lipoma.    Review of Systems   Constitutional: Negative.    HENT: Negative.     Eyes: Negative.    Respiratory: Negative.     Cardiovascular: Negative.    Gastrointestinal: Negative.    Endocrine: Negative.  Negative for polyphagia.   Genitourinary: Negative.    Musculoskeletal:  Positive for myalgias, neck pain and neck stiffness.   Skin: Negative.  Negative for pallor, rash and wound.   Allergic/Immunologic: Negative.    Neurological:  Negative for dizziness, facial asymmetry, weakness, light-headedness and headaches.   Hematological: Negative.    Psychiatric/Behavioral:  Positive for sleep disturbance. Negative for confusion and suicidal ideas. The patient is nervous/anxious.    All other systems reviewed and are negative.      Objective   /82   Pulse 89   Ht 1.6 m (5' 3\")   Wt 93 kg (205 lb)   SpO2 98%   BMI 36.31 kg/m²     Physical Exam  Vitals and nursing note reviewed.   Constitutional:       Appearance: Normal appearance. She is normal weight.   HENT:      Nose: Nose normal.      Mouth/Throat:      Mouth: Mucous membranes are moist.   Eyes:      Extraocular Movements: Extraocular movements intact.      Conjunctiva/sclera: Conjunctivae normal.      Pupils: Pupils are equal, round, and reactive to light.   Neck:      Thyroid: No thyroid mass.     Cardiovascular:      Rate and Rhythm: Normal rate and regular rhythm.      Pulses: Normal pulses.      Heart sounds: Normal heart sounds.   Pulmonary:      Effort: Pulmonary " effort is normal.   Abdominal:      General: Abdomen is flat. Bowel sounds are normal.      Palpations: Abdomen is soft. There is no mass.      Tenderness: There is no abdominal tenderness.   Musculoskeletal:         General: No tenderness. Normal range of motion.      Cervical back: Normal range of motion.   Skin:     General: Skin is warm and dry.   Neurological:      General: No focal deficit present.      Mental Status: She is alert and oriented to person, place, and time.   Psychiatric:         Mood and Affect: Mood normal.         Behavior: Behavior normal.         Assessment/Plan   1. Anxiety  - sertraline (Zoloft) 25 mg tablet; Take 1 tablet (25 mg) by mouth once daily.  Dispense: 30 tablet; Refill: 5    2. Neck mass (Primary)  - CT soft tissue neck w IV contrast; Future    3. Localized swelling, mass or lump of neck  - CT soft tissue neck w IV contrast; Future    4. Posterior neck pain  - CT soft tissue neck w IV contrast; Future    3-week follow-up for medication assessment complete CT scan as noted per tenderness noted to left neck

## 2024-10-23 NOTE — PATIENT INSTRUCTIONS
Take 5 mg every day for 1 week   And then take 5 mg every other day 1 week of escitalopram (Lexapro)

## 2024-10-25 DIAGNOSIS — E28.2 PCOS (POLYCYSTIC OVARIAN SYNDROME): ICD-10-CM

## 2024-10-25 RX ORDER — SPIRONOLACTONE 25 MG/1
TABLET ORAL
Qty: 15 TABLET | Refills: 3 | Status: SHIPPED | OUTPATIENT
Start: 2024-10-25

## 2024-11-13 ENCOUNTER — APPOINTMENT (OUTPATIENT)
Dept: PRIMARY CARE | Facility: CLINIC | Age: 29
End: 2024-11-13
Payer: MEDICAID

## 2024-11-13 VITALS
SYSTOLIC BLOOD PRESSURE: 120 MMHG | DIASTOLIC BLOOD PRESSURE: 80 MMHG | HEIGHT: 63 IN | WEIGHT: 210 LBS | HEART RATE: 97 BPM | BODY MASS INDEX: 37.21 KG/M2 | OXYGEN SATURATION: 98 %

## 2024-11-13 DIAGNOSIS — N92.6 IRREGULAR MENSES: Primary | ICD-10-CM

## 2024-11-13 DIAGNOSIS — L70.0 ACNE VULGARIS: ICD-10-CM

## 2024-11-13 DIAGNOSIS — L68.0 FEMALE HIRSUTISM: ICD-10-CM

## 2024-11-13 DIAGNOSIS — R63.5 WEIGHT GAIN: ICD-10-CM

## 2024-11-13 DIAGNOSIS — Z01.00 ENCOUNTER FOR EYE EXAM: ICD-10-CM

## 2024-11-13 DIAGNOSIS — Z01.82 ENCOUNTER FOR ALLERGY TESTING: ICD-10-CM

## 2024-11-13 DIAGNOSIS — R68.84 JAW PAIN: ICD-10-CM

## 2024-11-13 DIAGNOSIS — E66.812 CLASS 2 OBESITY: ICD-10-CM

## 2024-11-13 PROBLEM — K80.20 CHOLELITHIASIS: Status: ACTIVE | Noted: 2024-11-13

## 2024-11-13 PROBLEM — E28.2 PCOS (POLYCYSTIC OVARIAN SYNDROME): Status: ACTIVE | Noted: 2024-11-13

## 2024-11-13 PROBLEM — K80.47 CALCULUS OF BILE DUCT WITH ACUTE ON CHRONIC CHOLECYSTITIS WITH OBSTRUCTION: Status: ACTIVE | Noted: 2024-11-13

## 2024-11-13 PROBLEM — R42 DIZZINESS: Status: ACTIVE | Noted: 2021-08-18

## 2024-11-13 PROBLEM — K80.51 CHOLEDOCHOLITHIASIS WITH OBSTRUCTION: Status: ACTIVE | Noted: 2024-11-13

## 2024-11-13 PROCEDURE — 3008F BODY MASS INDEX DOCD: CPT | Performed by: NURSE PRACTITIONER

## 2024-11-13 PROCEDURE — 99213 OFFICE O/P EST LOW 20 MIN: CPT | Performed by: NURSE PRACTITIONER

## 2024-11-13 ASSESSMENT — ENCOUNTER SYMPTOMS
NEUROLOGICAL NEGATIVE: 1
CONFUSION: 0
WEAKNESS: 0
ENDOCRINE NEGATIVE: 1
CONSTITUTIONAL NEGATIVE: 1
LIGHT-HEADEDNESS: 0
SLEEP DISTURBANCE: 1
ALLERGIC/IMMUNOLOGIC NEGATIVE: 1
GASTROINTESTINAL NEGATIVE: 1
DIZZINESS: 0
MUSCULOSKELETAL NEGATIVE: 1
EYES NEGATIVE: 1
NERVOUS/ANXIOUS: 1
CARDIOVASCULAR NEGATIVE: 1
WOUND: 0
HEMATOLOGIC/LYMPHATIC NEGATIVE: 1
RESPIRATORY NEGATIVE: 1
POLYPHAGIA: 0
FACIAL ASYMMETRY: 0

## 2024-11-13 ASSESSMENT — PATIENT HEALTH QUESTIONNAIRE - PHQ9
2. FEELING DOWN, DEPRESSED OR HOPELESS: SEVERAL DAYS
SUM OF ALL RESPONSES TO PHQ9 QUESTIONS 1 AND 2: 2
1. LITTLE INTEREST OR PLEASURE IN DOING THINGS: SEVERAL DAYS
10. IF YOU CHECKED OFF ANY PROBLEMS, HOW DIFFICULT HAVE THESE PROBLEMS MADE IT FOR YOU TO DO YOUR WORK, TAKE CARE OF THINGS AT HOME, OR GET ALONG WITH OTHER PEOPLE: SOMEWHAT DIFFICULT

## 2024-11-13 NOTE — PROGRESS NOTES
"Subjective   Patient ID: Do Cha is a 29 y.o. female who presents for Follow-up (Zoloft follow up- Not liking the side effects, feeling bad anger and cloudy mind).    ROBERTO Veras is here for medication follow up.   States the Zoloft  makes her angry has been on for two weeks   Completely stopped lexpro   Has tried Wellbutrin and bupasr in past: will complete GENE testing today in clinic   Wants to stop meds for PCOS and repeat   Labs:  Has gained weight;   Has been having nose bleeds   Air is dry wants to complete allergy testing   - needs eye exam referra  Still c/o pain in jaw: audible cracking present  Reviewed previous xrays    IMPRESSION:  No radiographic abnormality in the temporomandibular joints. If there  is persistent concern MRI can be performed for complete evaluation  --> should complete MRI for follow up   Review of Systems   Constitutional: Negative.    HENT: Negative.     Eyes: Negative.    Respiratory: Negative.     Cardiovascular: Negative.    Gastrointestinal: Negative.    Endocrine: Negative.  Negative for polyphagia.   Genitourinary: Negative.    Musculoskeletal: Negative.    Skin: Negative.  Negative for pallor, rash and wound.   Allergic/Immunologic: Negative.    Neurological: Negative.  Negative for dizziness, facial asymmetry, weakness and light-headedness.   Hematological: Negative.    Psychiatric/Behavioral:  Positive for sleep disturbance. Negative for confusion and suicidal ideas. The patient is nervous/anxious.    All other systems reviewed and are negative.      Objective   Ht 1.6 m (5' 3\")   Wt 95.3 kg (210 lb)   BMI 37.20 kg/m²     Physical Exam  Vitals and nursing note reviewed.   Constitutional:       Appearance: Normal appearance. She is normal weight.   HENT:      Head: Normocephalic.      Jaw: Tenderness and pain on movement present.      Nose: Nose normal.      Mouth/Throat:      Mouth: Mucous membranes are moist.   Eyes:      Extraocular Movements: Extraocular " movements intact.      Conjunctiva/sclera: Conjunctivae normal.      Pupils: Pupils are equal, round, and reactive to light.   Cardiovascular:      Rate and Rhythm: Normal rate and regular rhythm.      Pulses: Normal pulses.      Heart sounds: Normal heart sounds.   Pulmonary:      Effort: Pulmonary effort is normal.   Abdominal:      General: Abdomen is flat. Bowel sounds are normal.      Palpations: Abdomen is soft.   Musculoskeletal:         General: Normal range of motion.      Cervical back: Normal range of motion.   Skin:     General: Skin is warm and dry.   Neurological:      General: No focal deficit present.      Mental Status: She is alert and oriented to person, place, and time.   Psychiatric:         Mood and Affect: Mood normal.         Behavior: Behavior normal.         Assessment/Plan   1. Irregular menses (Primary)  - Iron and TIBC; Future  - Hemoglobin A1C; Future  - CBC; Future    2. Female hirsutism  - Estrogens, Total; Future  - Luteinizing Hormone; Future  - Follicle Stimulating Hormone; Future  - Testosterone,Free and Total; Future    3. Class 2 obesity  - Hemoglobin A1C; Future    4. Weight gain  - Comprehensive Metabolic Panel; Future  - TSH with reflex to Free T4 if abnormal; Future    5. Acne vulgaris  - Testosterone,Free and Total; Future    6. Jaw pain  - MR face w and wo IV contrast; Future    7. Encounter for eye exam  - Referral to Ophthalmology; Future    8. Encounter for allergy testing  - Respiratory Allergy Profile IgE; Future       Will follow up after Gene testing for further insight on for anxiety medications

## 2024-11-18 ENCOUNTER — APPOINTMENT (OUTPATIENT)
Dept: RADIOLOGY | Facility: HOSPITAL | Age: 29
End: 2024-11-18
Payer: MEDICAID

## 2024-11-21 DIAGNOSIS — R22.1 NECK MASS: Primary | ICD-10-CM

## 2024-12-10 ENCOUNTER — HOSPITAL ENCOUNTER (OUTPATIENT)
Dept: RADIOLOGY | Facility: CLINIC | Age: 29
Discharge: HOME | End: 2024-12-10
Payer: MEDICAID

## 2024-12-10 DIAGNOSIS — R22.1 NECK MASS: ICD-10-CM

## 2024-12-10 PROCEDURE — 76536 US EXAM OF HEAD AND NECK: CPT | Performed by: RADIOLOGY

## 2024-12-10 PROCEDURE — 76536 US EXAM OF HEAD AND NECK: CPT

## 2024-12-13 DIAGNOSIS — F41.9 ANXIETY: Primary | ICD-10-CM

## 2024-12-13 DIAGNOSIS — R74.8 ELEVATED LIVER ENZYMES: ICD-10-CM

## 2024-12-13 RX ORDER — VENLAFAXINE HYDROCHLORIDE 37.5 MG/1
37.5 CAPSULE, EXTENDED RELEASE ORAL DAILY
Qty: 30 CAPSULE | Refills: 1 | Status: SHIPPED | OUTPATIENT
Start: 2024-12-13 | End: 2025-02-11

## 2024-12-19 ENCOUNTER — HOSPITAL ENCOUNTER (OUTPATIENT)
Dept: RADIOLOGY | Facility: HOSPITAL | Age: 29
End: 2024-12-19
Payer: MEDICAID

## 2024-12-20 ENCOUNTER — LAB (OUTPATIENT)
Dept: LAB | Facility: LAB | Age: 29
End: 2024-12-20
Payer: MEDICAID

## 2024-12-20 DIAGNOSIS — L68.0 FEMALE HIRSUTISM: ICD-10-CM

## 2024-12-20 DIAGNOSIS — R63.5 WEIGHT GAIN: ICD-10-CM

## 2024-12-20 DIAGNOSIS — Z01.82 ENCOUNTER FOR ALLERGY TESTING: ICD-10-CM

## 2024-12-20 DIAGNOSIS — L70.0 ACNE VULGARIS: ICD-10-CM

## 2024-12-20 DIAGNOSIS — E66.812 CLASS 2 OBESITY: ICD-10-CM

## 2024-12-20 DIAGNOSIS — N92.6 IRREGULAR MENSES: ICD-10-CM

## 2024-12-20 LAB
ALBUMIN SERPL BCP-MCNC: 4.4 G/DL (ref 3.4–5)
ALP SERPL-CCNC: 129 U/L (ref 33–110)
ALT SERPL W P-5'-P-CCNC: 28 U/L (ref 7–45)
ANION GAP SERPL CALC-SCNC: 11 MMOL/L (ref 10–20)
AST SERPL W P-5'-P-CCNC: 23 U/L (ref 9–39)
BILIRUB SERPL-MCNC: 0.6 MG/DL (ref 0–1.2)
BUN SERPL-MCNC: 7 MG/DL (ref 6–23)
CALCIUM SERPL-MCNC: 9.1 MG/DL (ref 8.6–10.3)
CHLORIDE SERPL-SCNC: 105 MMOL/L (ref 98–107)
CO2 SERPL-SCNC: 28 MMOL/L (ref 21–32)
CREAT SERPL-MCNC: 0.66 MG/DL (ref 0.5–1.05)
EGFRCR SERPLBLD CKD-EPI 2021: >90 ML/MIN/1.73M*2
ERYTHROCYTE [DISTWIDTH] IN BLOOD BY AUTOMATED COUNT: 12.2 % (ref 11.5–14.5)
EST. AVERAGE GLUCOSE BLD GHB EST-MCNC: 103 MG/DL
FSH SERPL-ACNC: 4.9 IU/L
GLUCOSE SERPL-MCNC: 92 MG/DL (ref 74–99)
HBA1C MFR BLD: 5.2 %
HCT VFR BLD AUTO: 43.1 % (ref 36–46)
HGB BLD-MCNC: 14.3 G/DL (ref 12–16)
IRON SATN MFR SERPL: 13 % (ref 25–45)
IRON SERPL-MCNC: 55 UG/DL (ref 35–150)
LH SERPL-ACNC: 4.5 IU/L
MCH RBC QN AUTO: 27.5 PG (ref 26–34)
MCHC RBC AUTO-ENTMCNC: 33.2 G/DL (ref 32–36)
MCV RBC AUTO: 83 FL (ref 80–100)
NRBC BLD-RTO: 0 /100 WBCS (ref 0–0)
PLATELET # BLD AUTO: 320 X10*3/UL (ref 150–450)
POTASSIUM SERPL-SCNC: 4.1 MMOL/L (ref 3.5–5.3)
PROT SERPL-MCNC: 7.3 G/DL (ref 6.4–8.2)
RBC # BLD AUTO: 5.2 X10*6/UL (ref 4–5.2)
SODIUM SERPL-SCNC: 140 MMOL/L (ref 136–145)
TIBC SERPL-MCNC: 430 UG/DL (ref 240–445)
TSH SERPL-ACNC: 2.23 MIU/L (ref 0.44–3.98)
UIBC SERPL-MCNC: 375 UG/DL (ref 110–370)
WBC # BLD AUTO: 5 X10*3/UL (ref 4.4–11.3)

## 2024-12-20 PROCEDURE — 84402 ASSAY OF FREE TESTOSTERONE: CPT

## 2024-12-20 PROCEDURE — 83002 ASSAY OF GONADOTROPIN (LH): CPT

## 2024-12-20 PROCEDURE — 36415 COLL VENOUS BLD VENIPUNCTURE: CPT

## 2024-12-20 PROCEDURE — 80053 COMPREHEN METABOLIC PANEL: CPT

## 2024-12-20 PROCEDURE — 82672 ASSAY OF ESTROGEN: CPT

## 2024-12-20 PROCEDURE — 83540 ASSAY OF IRON: CPT

## 2024-12-20 PROCEDURE — 83001 ASSAY OF GONADOTROPIN (FSH): CPT

## 2024-12-20 PROCEDURE — 86003 ALLG SPEC IGE CRUDE XTRC EA: CPT

## 2024-12-20 PROCEDURE — 85027 COMPLETE CBC AUTOMATED: CPT

## 2024-12-20 PROCEDURE — 83036 HEMOGLOBIN GLYCOSYLATED A1C: CPT

## 2024-12-20 PROCEDURE — 82785 ASSAY OF IGE: CPT

## 2024-12-20 PROCEDURE — 83550 IRON BINDING TEST: CPT

## 2024-12-20 PROCEDURE — 84443 ASSAY THYROID STIM HORMONE: CPT

## 2024-12-21 LAB

## 2024-12-24 LAB — ESTROGEN SERPL-MCNC: 194 PG/ML

## 2024-12-27 LAB
TESTOSTERONE FREE (CHAN): 2.6 PG/ML (ref 0.1–6.4)
TESTOSTERONE,TOTAL,LC-MS/MS: 17 NG/DL (ref 2–45)

## 2025-01-28 ENCOUNTER — HOSPITAL ENCOUNTER (OUTPATIENT)
Dept: RADIOLOGY | Facility: CLINIC | Age: 30
Discharge: HOME | End: 2025-01-28
Payer: MEDICAID

## 2025-01-28 DIAGNOSIS — R74.8 ELEVATED LIVER ENZYMES: ICD-10-CM

## 2025-01-28 PROCEDURE — 76705 ECHO EXAM OF ABDOMEN: CPT

## 2025-01-28 PROCEDURE — 76705 ECHO EXAM OF ABDOMEN: CPT | Performed by: RADIOLOGY

## 2025-02-03 DIAGNOSIS — N13.30 HYDRONEPHROSIS DETERMINED BY ULTRASOUND: Primary | ICD-10-CM

## 2025-02-20 ENCOUNTER — APPOINTMENT (OUTPATIENT)
Dept: CARDIOLOGY | Facility: HOSPITAL | Age: 30
End: 2025-02-20
Payer: MEDICAID

## 2025-02-20 ENCOUNTER — APPOINTMENT (OUTPATIENT)
Dept: RADIOLOGY | Facility: HOSPITAL | Age: 30
End: 2025-02-20
Payer: MEDICAID

## 2025-02-20 ENCOUNTER — HOSPITAL ENCOUNTER (EMERGENCY)
Facility: HOSPITAL | Age: 30
Discharge: HOME | End: 2025-02-20
Payer: MEDICAID

## 2025-02-20 VITALS
HEIGHT: 63 IN | SYSTOLIC BLOOD PRESSURE: 152 MMHG | DIASTOLIC BLOOD PRESSURE: 88 MMHG | WEIGHT: 210 LBS | BODY MASS INDEX: 37.21 KG/M2 | RESPIRATION RATE: 18 BRPM | HEART RATE: 98 BPM | TEMPERATURE: 97.9 F | OXYGEN SATURATION: 98 %

## 2025-02-20 DIAGNOSIS — E28.2 PCOS (POLYCYSTIC OVARIAN SYNDROME): ICD-10-CM

## 2025-02-20 DIAGNOSIS — R11.2 NAUSEA AND VOMITING, UNSPECIFIED VOMITING TYPE: Primary | ICD-10-CM

## 2025-02-20 DIAGNOSIS — K52.9 ENTERITIS: ICD-10-CM

## 2025-02-20 LAB
ALBUMIN SERPL BCP-MCNC: 4.4 G/DL (ref 3.4–5)
ALP SERPL-CCNC: 124 U/L (ref 33–110)
ALT SERPL W P-5'-P-CCNC: 24 U/L (ref 7–45)
ANION GAP SERPL CALC-SCNC: 14 MMOL/L (ref 10–20)
APPEARANCE UR: CLEAR
AST SERPL W P-5'-P-CCNC: 31 U/L (ref 9–39)
B-HCG SERPL-ACNC: <2 MIU/ML
BASOPHILS # BLD AUTO: 0.03 X10*3/UL (ref 0–0.1)
BASOPHILS NFR BLD AUTO: 0.2 %
BILIRUB SERPL-MCNC: 0.9 MG/DL (ref 0–1.2)
BILIRUB UR STRIP.AUTO-MCNC: NEGATIVE MG/DL
BUN SERPL-MCNC: 9 MG/DL (ref 6–23)
CALCIUM SERPL-MCNC: 9.1 MG/DL (ref 8.6–10.3)
CHLORIDE SERPL-SCNC: 102 MMOL/L (ref 98–107)
CO2 SERPL-SCNC: 23 MMOL/L (ref 21–32)
COLOR UR: YELLOW
CREAT SERPL-MCNC: 0.72 MG/DL (ref 0.5–1.05)
EGFRCR SERPLBLD CKD-EPI 2021: >90 ML/MIN/1.73M*2
EOSINOPHIL # BLD AUTO: 0.02 X10*3/UL (ref 0–0.7)
EOSINOPHIL NFR BLD AUTO: 0.2 %
ERYTHROCYTE [DISTWIDTH] IN BLOOD BY AUTOMATED COUNT: 12.3 % (ref 11.5–14.5)
FLUAV RNA RESP QL NAA+PROBE: NOT DETECTED
FLUBV RNA RESP QL NAA+PROBE: NOT DETECTED
GLUCOSE SERPL-MCNC: 108 MG/DL (ref 74–99)
GLUCOSE UR STRIP.AUTO-MCNC: NORMAL MG/DL
HCT VFR BLD AUTO: 43.2 % (ref 36–46)
HGB BLD-MCNC: 14.5 G/DL (ref 12–16)
IMM GRANULOCYTES # BLD AUTO: 0.04 X10*3/UL (ref 0–0.7)
IMM GRANULOCYTES NFR BLD AUTO: 0.3 % (ref 0–0.9)
KETONES UR STRIP.AUTO-MCNC: NEGATIVE MG/DL
LEUKOCYTE ESTERASE UR QL STRIP.AUTO: NEGATIVE
LIPASE SERPL-CCNC: 29 U/L (ref 9–82)
LYMPHOCYTES # BLD AUTO: 1.22 X10*3/UL (ref 1.2–4.8)
LYMPHOCYTES NFR BLD AUTO: 9.6 %
MAGNESIUM SERPL-MCNC: 1.72 MG/DL (ref 1.6–2.4)
MCH RBC QN AUTO: 27.7 PG (ref 26–34)
MCHC RBC AUTO-ENTMCNC: 33.6 G/DL (ref 32–36)
MCV RBC AUTO: 82 FL (ref 80–100)
MONOCYTES # BLD AUTO: 0.59 X10*3/UL (ref 0.1–1)
MONOCYTES NFR BLD AUTO: 4.6 %
MUCOUS THREADS #/AREA URNS AUTO: ABNORMAL /LPF
NEUTROPHILS # BLD AUTO: 10.81 X10*3/UL (ref 1.2–7.7)
NEUTROPHILS NFR BLD AUTO: 85.1 %
NITRITE UR QL STRIP.AUTO: NEGATIVE
NRBC BLD-RTO: 0 /100 WBCS (ref 0–0)
PH UR STRIP.AUTO: 6 [PH]
PLATELET # BLD AUTO: 319 X10*3/UL (ref 150–450)
POTASSIUM SERPL-SCNC: 3.9 MMOL/L (ref 3.5–5.3)
PROT SERPL-MCNC: 7.9 G/DL (ref 6.4–8.2)
PROT UR STRIP.AUTO-MCNC: ABNORMAL MG/DL
RBC # BLD AUTO: 5.24 X10*6/UL (ref 4–5.2)
RBC # UR STRIP.AUTO: ABNORMAL MG/DL
RBC #/AREA URNS AUTO: ABNORMAL /HPF
SARS-COV-2 RNA RESP QL NAA+PROBE: NOT DETECTED
SODIUM SERPL-SCNC: 135 MMOL/L (ref 136–145)
SP GR UR STRIP.AUTO: >1.05
SQUAMOUS #/AREA URNS AUTO: ABNORMAL /HPF
UROBILINOGEN UR STRIP.AUTO-MCNC: ABNORMAL MG/DL
WBC # BLD AUTO: 12.7 X10*3/UL (ref 4.4–11.3)
WBC #/AREA URNS AUTO: ABNORMAL /HPF

## 2025-02-20 PROCEDURE — 81001 URINALYSIS AUTO W/SCOPE: CPT | Performed by: NURSE PRACTITIONER

## 2025-02-20 PROCEDURE — 87636 SARSCOV2 & INF A&B AMP PRB: CPT | Performed by: NURSE PRACTITIONER

## 2025-02-20 PROCEDURE — 96361 HYDRATE IV INFUSION ADD-ON: CPT

## 2025-02-20 PROCEDURE — 93005 ELECTROCARDIOGRAM TRACING: CPT

## 2025-02-20 PROCEDURE — 2550000001 HC RX 255 CONTRASTS: Performed by: NURSE PRACTITIONER

## 2025-02-20 PROCEDURE — 96375 TX/PRO/DX INJ NEW DRUG ADDON: CPT

## 2025-02-20 PROCEDURE — 84702 CHORIONIC GONADOTROPIN TEST: CPT | Performed by: PHYSICIAN ASSISTANT

## 2025-02-20 PROCEDURE — 85025 COMPLETE CBC W/AUTO DIFF WBC: CPT | Performed by: NURSE PRACTITIONER

## 2025-02-20 PROCEDURE — 36415 COLL VENOUS BLD VENIPUNCTURE: CPT | Performed by: NURSE PRACTITIONER

## 2025-02-20 PROCEDURE — 74177 CT ABD & PELVIS W/CONTRAST: CPT | Performed by: RADIOLOGY

## 2025-02-20 PROCEDURE — 83735 ASSAY OF MAGNESIUM: CPT | Performed by: NURSE PRACTITIONER

## 2025-02-20 PROCEDURE — 2500000004 HC RX 250 GENERAL PHARMACY W/ HCPCS (ALT 636 FOR OP/ED): Performed by: PHYSICIAN ASSISTANT

## 2025-02-20 PROCEDURE — 96374 THER/PROPH/DIAG INJ IV PUSH: CPT

## 2025-02-20 PROCEDURE — 99285 EMERGENCY DEPT VISIT HI MDM: CPT | Mod: 25

## 2025-02-20 PROCEDURE — 74177 CT ABD & PELVIS W/CONTRAST: CPT

## 2025-02-20 PROCEDURE — 83690 ASSAY OF LIPASE: CPT | Performed by: NURSE PRACTITIONER

## 2025-02-20 PROCEDURE — 80053 COMPREHEN METABOLIC PANEL: CPT | Performed by: NURSE PRACTITIONER

## 2025-02-20 RX ORDER — ONDANSETRON HYDROCHLORIDE 2 MG/ML
4 INJECTION, SOLUTION INTRAVENOUS ONCE
Status: COMPLETED | OUTPATIENT
Start: 2025-02-20 | End: 2025-02-20

## 2025-02-20 RX ORDER — ONDANSETRON 4 MG/1
4 TABLET, ORALLY DISINTEGRATING ORAL EVERY 8 HOURS PRN
Qty: 20 TABLET | Refills: 0 | Status: SHIPPED | OUTPATIENT
Start: 2025-02-20 | End: 2025-02-27

## 2025-02-20 RX ORDER — KETOROLAC TROMETHAMINE 30 MG/ML
15 INJECTION, SOLUTION INTRAMUSCULAR; INTRAVENOUS ONCE
Status: COMPLETED | OUTPATIENT
Start: 2025-02-20 | End: 2025-02-20

## 2025-02-20 RX ADMIN — KETOROLAC TROMETHAMINE 15 MG: 30 INJECTION, SOLUTION INTRAMUSCULAR at 21:54

## 2025-02-20 RX ADMIN — IOHEXOL 75 ML: 350 INJECTION, SOLUTION INTRAVENOUS at 18:22

## 2025-02-20 RX ADMIN — ONDANSETRON 4 MG: 2 INJECTION INTRAMUSCULAR; INTRAVENOUS at 21:54

## 2025-02-20 RX ADMIN — SODIUM CHLORIDE 1000 ML: 9 INJECTION, SOLUTION INTRAVENOUS at 21:38

## 2025-02-20 ASSESSMENT — PAIN DESCRIPTION - PAIN TYPE: TYPE: ACUTE PAIN

## 2025-02-20 ASSESSMENT — LIFESTYLE VARIABLES
TOTAL SCORE: 0
HAVE YOU EVER FELT YOU SHOULD CUT DOWN ON YOUR DRINKING: NO
HAVE PEOPLE ANNOYED YOU BY CRITICIZING YOUR DRINKING: NO
EVER HAD A DRINK FIRST THING IN THE MORNING TO STEADY YOUR NERVES TO GET RID OF A HANGOVER: NO
EVER FELT BAD OR GUILTY ABOUT YOUR DRINKING: NO

## 2025-02-20 ASSESSMENT — COLUMBIA-SUICIDE SEVERITY RATING SCALE - C-SSRS
6. HAVE YOU EVER DONE ANYTHING, STARTED TO DO ANYTHING, OR PREPARED TO DO ANYTHING TO END YOUR LIFE?: NO
2. HAVE YOU ACTUALLY HAD ANY THOUGHTS OF KILLING YOURSELF?: NO
1. IN THE PAST MONTH, HAVE YOU WISHED YOU WERE DEAD OR WISHED YOU COULD GO TO SLEEP AND NOT WAKE UP?: NO

## 2025-02-20 ASSESSMENT — PAIN SCALES - GENERAL: PAINLEVEL_OUTOF10: 8

## 2025-02-20 ASSESSMENT — PAIN - FUNCTIONAL ASSESSMENT: PAIN_FUNCTIONAL_ASSESSMENT: 0-10

## 2025-02-20 ASSESSMENT — PAIN DESCRIPTION - LOCATION: LOCATION: ABDOMEN

## 2025-02-20 NOTE — ED TRIAGE NOTES
Pt presents to ED with c/o vomiting, diarrhea and abdominal pain. Pt states she can't keep anything down, pt states daughter is also sick.

## 2025-02-20 NOTE — ED TRIAGE NOTES
TRIAGE NOTE   I saw the patient as the Clinician in Triage and performed a brief history and physical exam, established acuity, and ordered appropriate tests to develop basic plan of care. Patient will be seen by an [facility], resident and/or physician who will independently evaluate the patient. Please see subsequent provider notes for further details and disposition.     Brief HPI: In brief, Do Cha is a 29 y.o. female with significant PMH for cholecystectomy presenting to ED today from home by herself for evaluation of nausea/vomiting/diarrhea.  Since this morning, the patient has had nausea up to 5 episodes of vomiting and episodes of nonbloody diarrhea too numerous to count.  Developed lower abdominal pain that is radiated up toward the epigastric region.  Unable to eat food, able to take minimal fluids.  Denies pregnancy.  Denies fever/chills, cough/cold symptoms, chest pain, shortness of breath, dysuria/hematuria, bloody/black bowel movements or any other complaints.  No smoking, EtOH or IV drugs.  PCP is Annel WEATHERS.    Focused Physical exam:   General: 29-year-old female, awake and alert, oriented x 3.  Well-nourished and hydrated.  Nontoxic looking.  Skin: Pink, warm and dry.  Cardiac: Tachycardic, regular rhythm.  Pulmonary: Clear bilaterally.  Abdomen: Round and soft with bowel sounds, tender in the epigastric region.  No rebound or guarding.  No palpable organomegaly.  No CVA tenderness.    Plan/MDM:   49-year-old female with history of cholecystectomy is evaluated at the bedside for nausea/vomiting/diarrhea and epigastric pain began this morning.  On arrival to the ED, blood pressure 161/75, tachycardic at 112.  Patient is not hypoxic or currently febrile.  Abdomen is soft with bowel sounds, tender in the epigastric region.  IV established, will check basic labs, UA/urine culture and perform CT abdomen/pelvis with contrast in preparation for further evaluation in the main ED.  Patient is  agreeable to this plan.    Please see subsequent provider note for further details and disposition

## 2025-02-21 LAB — HOLD SPECIMEN: NORMAL

## 2025-02-21 NOTE — ED PROVIDER NOTES
HPI   Chief Complaint   Patient presents with    Vomiting       29 y.o. female with significant PMH for cholecystectomy presenting to ED today from home by herself for evaluation of nausea/vomiting/diarrhea.  Since this morning, the patient has had nausea up to 5 episodes of vomiting and episodes of nonbloody diarrhea too numerous to count.  Developed lower abdominal pain that is radiated up toward the epigastric region.  Unable to eat food, able to take minimal fluids.  Denies pregnancy.  Denies fever/chills, cough/cold symptoms, chest pain, shortness of breath, dysuria/hematuria, bloody/black bowel movements or any other complaints.               Patient History   Past Medical History:   Diagnosis Date    Other specified health status     No pertinent past medical history    PCOS (polycystic ovarian syndrome)      Past Surgical History:   Procedure Laterality Date    CHOLECYSTECTOMY  8/1/2022    OTHER SURGICAL HISTORY  08/16/2022    Cholecystectomy laparoscopic     Family History   Problem Relation Name Age of Onset    Arthritis Mother Luzma      Social History     Tobacco Use    Smoking status: Never     Passive exposure: Never    Smokeless tobacco: Never    Tobacco comments:     None   Vaping Use    Vaping status: Never Used   Substance Use Topics    Alcohol use: Not Currently    Drug use: Never       Physical Exam   ED Triage Vitals [02/20/25 1622]   Temperature Heart Rate Respirations BP   36.6 °C (97.9 °F) (!) 112 18 161/75      Pulse Ox Temp src Heart Rate Source Patient Position   100 % -- -- --      BP Location FiO2 (%)     -- --       Physical Exam  Vitals and nursing note reviewed.   Constitutional:       General: She is not in acute distress.     Appearance: Normal appearance. She is normal weight. She is not ill-appearing, toxic-appearing or diaphoretic.   HENT:      Head: Normocephalic.      Nose: Nose normal.      Mouth/Throat:      Mouth: Mucous membranes are moist.   Eyes:      Extraocular  Movements: Extraocular movements intact.      Conjunctiva/sclera: Conjunctivae normal.   Cardiovascular:      Rate and Rhythm: Normal rate and regular rhythm.      Pulses: Normal pulses.   Pulmonary:      Effort: Pulmonary effort is normal. No respiratory distress.      Breath sounds: Normal breath sounds.   Abdominal:      General: Abdomen is flat. There is no distension.      Palpations: Abdomen is soft.      Tenderness: There is abdominal tenderness. There is no guarding or rebound.      Comments: Generalized tenderness without distention or guarding   Musculoskeletal:         General: Normal range of motion.      Cervical back: Normal range of motion and neck supple.   Skin:     General: Skin is warm and dry.      Capillary Refill: Capillary refill takes less than 2 seconds.   Neurological:      General: No focal deficit present.      Mental Status: She is alert and oriented to person, place, and time.   Psychiatric:         Mood and Affect: Mood normal.         Behavior: Behavior normal.         Thought Content: Thought content normal.         Judgment: Judgment normal.           ED Course & MDM   ED Course as of 25   Thu  Coronavirus 2019, PCR: Not Detected [DS]    Flu B Result: Not Detected [DS]    Flu A Result: Not Detected [DS]    LIPASE: 29 [DS]    MAGNESIUM: 1.72 [DS]    GLUCOSE(!): 108 [DS]    SODIUM(!): 135 [DS]    POTASSIUM: 3.9 [DS]    Creatinine: 0.72 [DS]    AST: 31 [DS]    ALT: 24 [DS]    Bilirubin, Total : 0.9  UA non-specific  [DS]    WBC(!): 12.7 [DS]    HEMOGLOBIN: 14.5 [DS]    IMPRESSION:  1. Small amount of nonspecific fluid intermittently throughout the  small bowel loops. Correlate for possible enteritis. No bowel  obstruction.  2. Septated left adnexal cysts versus 2 adjacent cysts. This may be  further assessed with pelvic ultrasound.   [DS]      ED Course User Index  [DS] Amos Palacio PA-C                  No data recorded                                 Medical Decision Making    Medical Decision Making & ED Course  Medical Decision Makin-year-old female seen and evaluated for abdominal pain nausea vomiting diarrhea.  The patient reported a sick contact in the household with similar symptoms.  Patient was initially evaluated by the triage provider who ordered blood work along with CT imaging.  Patient was found to have a leukocytosis of 12.7.  No evidence of transaminitis.  Creatinine within normal limits.  Potassium and magnesium within normal limits.  COVID testing negative.  Influenza testing also found to be negative.  CT imaging revealed a small amount of nonspecific fluid intermittently throughout the small bowel loops consistent with enteritis.  There is no evidence of bowel obstruction.  This is consistent with history of present illness.  Additionally the patient was found to have a septated left adnexal cyst versus 2 adjacent cysts.  This is consistent with her history of PCOS.  Patient was given IV hydration along with Zofran and Toradol.  Patient was reassessed.  She was able to consume p.o. fluids.  Explained my concern for viral etiology.  No indication for further workup.  --  Scoring Tools Utilized: None    Differential diagnoses considered include but are not limited to: Gastritis, bowel obstruction, C. difficile infection, foodborne illness     Social Determinants of Health which Significantly Impact Care: None identified The following actions were taken to address these social determinants: None    EKG Independent Interpretation: EKG not obtained    Independent Result Review and Interpretation: Relevant laboratory and radiographic results were reviewed and independently interpreted by myself.  As necessary, they are commented on in the ED Course.    Chronic conditions affecting the patient's care: None    The patient was discussed with the following consultants/services:  None    Care Considerations: None    ED Course:  ED Course as of 25  ------------------------------------------------------------  Time: 2053  Value: Coronavirus 2019, PCR: Not Detected  Comment: (Reviewed)  By: Amos Palacio PA-C  ------------------------------------------------------------  Time: 2053  Value: Flu B Result: Not Detected  Comment: (Reviewed)  By: Amos Palacio PA-C  ------------------------------------------------------------  Time: 2053  Value: Flu A Result: Not Detected  Comment: (Reviewed)  By: Amos Palacio PA-C  ------------------------------------------------------------  Time: 2053  Value: LIPASE: 29  Comment: (Reviewed)  By: Amos Palacio PA-C  ------------------------------------------------------------  Time: 2053  Value: MAGNESIUM: 1.72  Comment: (Reviewed)  By: Amos Palacio PA-C  ------------------------------------------------------------  Time: 2053  Value: GLUCOSE(!): 108  Comment: (Reviewed)  By: TIBURCIO Vazquez-C  ------------------------------------------------------------  Time: 2053  Value: SODIUM(!): 135  Comment: (Reviewed)  By: Amos Palacio PA-C  ------------------------------------------------------------  Time: 2053  Value: POTASSIUM: 3.9  Comment: (Reviewed)  By: Amos Palacio PA-C  ------------------------------------------------------------  Time: 2053  Value: Creatinine: 0.72  Comment: (Reviewed)  By: CARMEN VazquezC  ------------------------------------------------------------  Time: 2053  Value: AST: 31  Comment: (Reviewed)  By: Amos Palacio PA-C  ------------------------------------------------------------  Time: 2053  Value: ALT: 24  Comment: (Reviewed)  By: Amos Palacio PA-C  ------------------------------------------------------------  Time: 2053  Value: Bilirubin, Total : 0.9  Comment: UA non-specific   By: Amos Summers,  RANDA  ------------------------------------------------------------  Time: 02/20 2054  Value: WBC(!): 12.7  Comment: (Reviewed)  By: Amos Palacio PA-C  ------------------------------------------------------------  Time: 02/20 2054  Value: HEMOGLOBIN: 14.5  Comment: (Reviewed)  By: Amos Palacio PA-C  ------------------------------------------------------------  Time: 02/20 2054  Comment: IMPRESSION:1. Small amount of nonspecific fluid intermittently throughout thesmall bowel loops. Correlate for possible enteritis. No bowelobstruction.2. Septated left adnexal cysts versus 2 adjacent cysts. This may befurther assessed with pelvic ultrasound.  By: Amos Palacio PA-C     Disposition  As a result of the work-up, the patient was discharged home.  she was informed of her diagnosis and instructed to come back with any concerns or worsening of condition.  she and was agreeable to the plan as discussed above.  she was given the opportunity to ask questions.  All of the patient's questions were answered.      Patient was seen independently    Amos Palacio PA-C  Emergency Medicine            Procedure  Procedures     Amos Palacio PA-C  02/20/25 5787

## 2025-02-28 LAB
ATRIAL RATE: 129 BPM
P AXIS: 56 DEGREES
P OFFSET: 209 MS
P ONSET: 156 MS
PR INTERVAL: 138 MS
Q ONSET: 225 MS
QRS COUNT: 22 BEATS
QRS DURATION: 72 MS
QT INTERVAL: 308 MS
QTC CALCULATION(BAZETT): 451 MS
QTC FREDERICIA: 397 MS
R AXIS: 81 DEGREES
T AXIS: 47 DEGREES
T OFFSET: 379 MS
VENTRICULAR RATE: 129 BPM

## 2025-03-28 ENCOUNTER — APPOINTMENT (OUTPATIENT)
Dept: PRIMARY CARE | Facility: CLINIC | Age: 30
End: 2025-03-28
Payer: MEDICAID

## 2025-06-09 DIAGNOSIS — R10.2 PELVIC PAIN: ICD-10-CM

## 2025-06-09 DIAGNOSIS — N83.202 LEFT OVARIAN CYST: Primary | ICD-10-CM

## 2025-06-16 DIAGNOSIS — H66.91 ACUTE BACTERIAL INFECTION OF RIGHT MIDDLE EAR: Primary | ICD-10-CM

## 2025-06-16 RX ORDER — CIPROFLOXACIN AND DEXAMETHASONE 3; 1 MG/ML; MG/ML
4 SUSPENSION/ DROPS AURICULAR (OTIC) 2 TIMES DAILY
Qty: 7.5 ML | Refills: 0 | Status: SHIPPED | OUTPATIENT
Start: 2025-06-16 | End: 2025-06-23

## 2025-06-17 ENCOUNTER — OFFICE VISIT (OUTPATIENT)
Dept: URGENT CARE | Age: 30
End: 2025-06-17
Payer: COMMERCIAL

## 2025-06-17 VITALS
OXYGEN SATURATION: 99 % | RESPIRATION RATE: 20 BRPM | SYSTOLIC BLOOD PRESSURE: 131 MMHG | DIASTOLIC BLOOD PRESSURE: 85 MMHG | HEART RATE: 84 BPM | TEMPERATURE: 97.8 F

## 2025-06-17 DIAGNOSIS — H61.20 IMPACTED CERUMEN, UNSPECIFIED LATERALITY: Primary | ICD-10-CM

## 2025-06-17 DIAGNOSIS — L03.811 CELLULITIS OF HEAD EXCEPT FACE: Primary | ICD-10-CM

## 2025-06-17 RX ORDER — CIPROFLOXACIN 500 MG/1
500 TABLET, FILM COATED ORAL 2 TIMES DAILY
Qty: 20 TABLET | Refills: 0 | Status: SHIPPED | OUTPATIENT
Start: 2025-06-17 | End: 2025-06-27

## 2025-06-17 ASSESSMENT — PAIN SCALES - GENERAL: PAINLEVEL_OUTOF10: 7

## 2025-06-17 ASSESSMENT — ENCOUNTER SYMPTOMS: CONSTITUTIONAL NEGATIVE: 1

## 2025-06-17 NOTE — PROGRESS NOTES
Subjective   Patient ID: Do Cha is a 29 y.o. female. They present today with a chief complaint of Earache (  Per Pt hx thought she had something in her right ear and put a q-tip in that ear and felt pain and stated since feels swelling area of right ear and pain for about six days.).    History of Present Illness  Pt is a 29 year old who presents with an earache she has swelling of her right ear and pain. She has been using cortisporin otic drow without relief and now has swelling of her earlobe          Past Medical History  Allergies as of 06/17/2025    (No Known Allergies)       Prescriptions Prior to Admission[1]     Medical History[2]    Surgical History[3]     reports that she has never smoked. She has never been exposed to tobacco smoke. She has never used smokeless tobacco. Alcohol use questions deferred to the physician. Drug use questions deferred to the physician.    Review of Systems  Review of Systems   Constitutional: Negative.    HENT:  Positive for ear pain.                                   Objective    Vitals:    06/17/25 1656   BP: 131/85   BP Location: Left arm   Patient Position: Sitting   BP Cuff Size: Large adult   Pulse: 84   Resp: 20   Temp: 36.6 °C (97.8 °F)   TempSrc: Oral   SpO2: 99%     Patient's last menstrual period was 06/03/2025 (approximate).    Physical Exam  Constitutional:       Appearance: Normal appearance.   HENT:      Right Ear: There is impacted cerumen.      Left Ear: Tympanic membrane normal.      Ears:      Comments: Swelling and erytema of right external earlobe  Drum with wax on it after irrigation and manual removal of wax  Neurological:      Mental Status: She is alert.         Procedures    Point of Care Test & Imaging Results from this visit  No results found for this visit on 06/17/25.   Imaging  No results found.    Cardiology, Vascular, and Other Imaging  No other imaging results found for the past 2 days      Diagnostic study results (if any) were  reviewed by Harper Rutledge MD.    Assessment/Plan   Allergies, medications, history, and pertinent labs/EKGs/Imaging reviewed by Harper Rutledge MD.     Medical Decision Making  Infection of external ear   Most of wax removed but some still remains    Orders and Diagnoses  Diagnoses and all orders for this visit:  Cellulitis of head except face  -     ciprofloxacin (Cipro) 500 mg tablet; Take 1 tablet (500 mg) by mouth 2 times a day for 10 days.      Medical Admin Record      Patient disposition: Home    Electronically signed by Harper Rutledge MD  5:44 PM           [1] (Not in a hospital admission)   [2]   Past Medical History:  Diagnosis Date    Other specified health status     No pertinent past medical history    PCOS (polycystic ovarian syndrome)    [3]   Past Surgical History:  Procedure Laterality Date    CHOLECYSTECTOMY  8/1/2022    OTHER SURGICAL HISTORY  08/16/2022    Cholecystectomy laparoscopic

## 2025-07-09 ENCOUNTER — PATIENT MESSAGE (OUTPATIENT)
Dept: PRIMARY CARE | Facility: CLINIC | Age: 30
End: 2025-07-09

## 2025-07-09 DIAGNOSIS — E28.2 PCOS (POLYCYSTIC OVARIAN SYNDROME): ICD-10-CM

## 2025-07-10 RX ORDER — METFORMIN HYDROCHLORIDE 500 MG/1
1000 TABLET, EXTENDED RELEASE ORAL
Qty: 120 TABLET | Refills: 11 | Status: SHIPPED | OUTPATIENT
Start: 2025-07-10 | End: 2026-07-10

## 2025-08-29 ENCOUNTER — ANCILLARY PROCEDURE (OUTPATIENT)
Dept: URGENT CARE | Age: 30
End: 2025-08-29
Payer: COMMERCIAL

## 2025-08-29 ENCOUNTER — OFFICE VISIT (OUTPATIENT)
Dept: URGENT CARE | Age: 30
End: 2025-08-29
Payer: COMMERCIAL

## 2025-08-29 VITALS
SYSTOLIC BLOOD PRESSURE: 133 MMHG | BODY MASS INDEX: 36.32 KG/M2 | HEIGHT: 63 IN | RESPIRATION RATE: 17 BRPM | TEMPERATURE: 98.3 F | HEART RATE: 78 BPM | WEIGHT: 205 LBS | OXYGEN SATURATION: 99 % | DIASTOLIC BLOOD PRESSURE: 84 MMHG

## 2025-08-29 DIAGNOSIS — M25.511 ACUTE PAIN OF RIGHT SHOULDER: Primary | ICD-10-CM

## 2025-08-29 DIAGNOSIS — S49.91XA RIGHT SHOULDER INJURY, INITIAL ENCOUNTER: ICD-10-CM

## 2025-08-29 DIAGNOSIS — S43.491A OTHER SPRAIN OF RIGHT SHOULDER JOINT, INITIAL ENCOUNTER: Primary | ICD-10-CM

## 2025-08-29 RX ORDER — IBUPROFEN 800 MG/1
800 TABLET, FILM COATED ORAL EVERY 8 HOURS PRN
Qty: 21 TABLET | Refills: 0 | Status: SHIPPED | OUTPATIENT
Start: 2025-08-29 | End: 2025-09-05

## 2025-08-29 ASSESSMENT — PATIENT HEALTH QUESTIONNAIRE - PHQ9
SUM OF ALL RESPONSES TO PHQ9 QUESTIONS 1 AND 2: 0
1. LITTLE INTEREST OR PLEASURE IN DOING THINGS: NOT AT ALL
2. FEELING DOWN, DEPRESSED OR HOPELESS: NOT AT ALL